# Patient Record
Sex: MALE | Race: WHITE | NOT HISPANIC OR LATINO | Employment: UNEMPLOYED | URBAN - METROPOLITAN AREA
[De-identification: names, ages, dates, MRNs, and addresses within clinical notes are randomized per-mention and may not be internally consistent; named-entity substitution may affect disease eponyms.]

---

## 2018-12-20 PROBLEM — H33.22 RETINAL DETACHMENT, LEFT: Status: ACTIVE | Noted: 2018-12-20

## 2018-12-20 PROBLEM — H21.42: Status: ACTIVE | Noted: 2018-12-20

## 2018-12-20 PROBLEM — H26.9 CATARACT OF LEFT EYE: Status: ACTIVE | Noted: 2018-12-20

## 2019-01-05 ENCOUNTER — HOSPITAL ENCOUNTER (EMERGENCY)
Facility: HOSPITAL | Age: 54
Discharge: HOME | End: 2019-01-05
Attending: EMERGENCY MEDICINE
Payer: MEDICAID

## 2019-01-05 ENCOUNTER — NURSE TRIAGE (OUTPATIENT)
Dept: ADMINISTRATIVE | Facility: CLINIC | Age: 54
End: 2019-01-05

## 2019-01-05 VITALS
TEMPERATURE: 99 F | HEART RATE: 86 BPM | HEIGHT: 68 IN | WEIGHT: 185 LBS | BODY MASS INDEX: 28.04 KG/M2 | DIASTOLIC BLOOD PRESSURE: 80 MMHG | OXYGEN SATURATION: 97 % | RESPIRATION RATE: 18 BRPM | SYSTOLIC BLOOD PRESSURE: 166 MMHG

## 2019-01-05 DIAGNOSIS — H40.9 ACUTE GLAUCOMA OF LEFT EYE: Primary | ICD-10-CM

## 2019-01-05 PROBLEM — H40.2220: Status: ACTIVE | Noted: 2019-01-05

## 2019-01-05 PROBLEM — H21.42: Status: ACTIVE | Noted: 2019-01-05

## 2019-01-05 PROCEDURE — 25000003 PHARM REV CODE 250: Performed by: EMERGENCY MEDICINE

## 2019-01-05 PROCEDURE — 99284 PR EMERGENCY DEPT VISIT,LEVEL IV: ICD-10-PCS | Mod: ,,, | Performed by: EMERGENCY MEDICINE

## 2019-01-05 PROCEDURE — 25000003 PHARM REV CODE 250: Performed by: PHYSICIAN ASSISTANT

## 2019-01-05 PROCEDURE — 96374 THER/PROPH/DIAG INJ IV PUSH: CPT

## 2019-01-05 PROCEDURE — 99284 EMERGENCY DEPT VISIT MOD MDM: CPT | Mod: ,,, | Performed by: EMERGENCY MEDICINE

## 2019-01-05 PROCEDURE — 99285 EMERGENCY DEPT VISIT HI MDM: CPT | Mod: 25

## 2019-01-05 PROCEDURE — 63600175 PHARM REV CODE 636 W HCPCS: Performed by: EMERGENCY MEDICINE

## 2019-01-05 RX ORDER — ONDANSETRON 2 MG/ML
4 INJECTION INTRAMUSCULAR; INTRAVENOUS
Status: COMPLETED | OUTPATIENT
Start: 2019-01-05 | End: 2019-01-05

## 2019-01-05 RX ORDER — DORZOLAMIDE HCL 20 MG/ML
1 SOLUTION/ DROPS OPHTHALMIC 3 TIMES DAILY
Qty: 10 ML | Refills: 0 | Status: SHIPPED | OUTPATIENT
Start: 2019-01-05 | End: 2019-02-04

## 2019-01-05 RX ORDER — PROPARACAINE HYDROCHLORIDE 5 MG/ML
1 SOLUTION/ DROPS OPHTHALMIC
Status: COMPLETED | OUTPATIENT
Start: 2019-01-05 | End: 2019-01-05

## 2019-01-05 RX ORDER — TIMOLOL MALEATE 5 MG/ML
1 SOLUTION/ DROPS OPHTHALMIC
Status: COMPLETED | OUTPATIENT
Start: 2019-01-05 | End: 2019-01-05

## 2019-01-05 RX ORDER — ACETAZOLAMIDE 500 MG/1
500 CAPSULE, EXTENDED RELEASE ORAL 2 TIMES DAILY
Qty: 60 CAPSULE | Refills: 0 | Status: SHIPPED | OUTPATIENT
Start: 2019-01-05 | End: 2019-01-11

## 2019-01-05 RX ORDER — DORZOLAMIDE HCL 20 MG/ML
1 SOLUTION/ DROPS OPHTHALMIC 3 TIMES DAILY
Status: DISCONTINUED | OUTPATIENT
Start: 2019-01-05 | End: 2019-01-05 | Stop reason: HOSPADM

## 2019-01-05 RX ORDER — TIMOLOL MALEATE 5 MG/ML
1 SOLUTION/ DROPS OPHTHALMIC 2 TIMES DAILY
Qty: 10 ML | Refills: 0 | Status: SHIPPED | OUTPATIENT
Start: 2019-01-05 | End: 2019-02-04

## 2019-01-05 RX ORDER — METOCLOPRAMIDE 10 MG/1
10 TABLET ORAL
Status: COMPLETED | OUTPATIENT
Start: 2019-01-05 | End: 2019-01-05

## 2019-01-05 RX ORDER — ONDANSETRON 4 MG/1
4 TABLET, FILM COATED ORAL EVERY 6 HOURS PRN
Qty: 20 TABLET | Refills: 0 | Status: SHIPPED | OUTPATIENT
Start: 2019-01-05 | End: 2019-01-10

## 2019-01-05 RX ORDER — ONDANSETRON 2 MG/ML
4 INJECTION INTRAMUSCULAR; INTRAVENOUS
Status: DISCONTINUED | OUTPATIENT
Start: 2019-01-05 | End: 2019-01-05

## 2019-01-05 RX ORDER — BRIMONIDINE TARTRATE 1.5 MG/ML
1 SOLUTION/ DROPS OPHTHALMIC 3 TIMES DAILY
Qty: 10 ML | Refills: 0 | Status: SHIPPED | OUTPATIENT
Start: 2019-01-05 | End: 2019-02-04

## 2019-01-05 RX ORDER — PROPARACAINE HYDROCHLORIDE 5 MG/ML
1 SOLUTION/ DROPS OPHTHALMIC
Status: DISCONTINUED | OUTPATIENT
Start: 2019-01-05 | End: 2019-01-05 | Stop reason: HOSPADM

## 2019-01-05 RX ORDER — BRIMONIDINE TARTRATE 1.5 MG/ML
1 SOLUTION/ DROPS OPHTHALMIC
Status: COMPLETED | OUTPATIENT
Start: 2019-01-05 | End: 2019-01-05

## 2019-01-05 RX ADMIN — TIMOLOL MALEATE 1 DROP: 5 SOLUTION OPHTHALMIC at 02:01

## 2019-01-05 RX ADMIN — DORZOLAMIDE HYDROCHLORIDE 1 DROP: 20 SOLUTION/ DROPS OPHTHALMIC at 03:01

## 2019-01-05 RX ADMIN — PROPARACAINE HYDROCHLORIDE 1 DROP: 5 SOLUTION/ DROPS OPHTHALMIC at 02:01

## 2019-01-05 RX ADMIN — BRIMONIDINE TARTRATE 1 DROP: 1.5 SOLUTION OPHTHALMIC at 02:01

## 2019-01-05 RX ADMIN — ONDANSETRON 4 MG: 2 INJECTION INTRAMUSCULAR; INTRAVENOUS at 03:01

## 2019-01-05 RX ADMIN — METOCLOPRAMIDE 10 MG: 10 TABLET ORAL at 06:01

## 2019-01-05 NOTE — ED NOTES
Pt resting in bed. No acute distress noted. Respirations even and unlabored. No new complaints voiced. Family at bedside. Will continue to monitor.

## 2019-01-05 NOTE — CONSULTS
[facility]  Ophthalmology  Consult Note    Patient Name: Jackelin Rocha  MRN: [id number]  Admission Date: 1/5/2019  Hospital Length of Stay: 0 days  Attending Provider: John Dos Santos MD   Primary Care Physician: Primary Doctor No  Principal Problem:<principal problem not specified>    Inpatient consult to Ophthalmology  Consult performed by: Valdo Cardoza MD  Consult ordered by: John Dos Santos MD  Reason for consult: left eye pain        Subjective:     Chief Complaint:  Left eye pain    HPI:   Mr. Rocha is a 54 y/o gentleman who was transferred to [facility] with left eye pain x 1 day. 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 Patient was treated with LPI on 12/20/18 and returned to clinic 1 week later for IOP check (seen by Dr. Aucña) and IOP at that time was 12. As per chart review patient is questionably barely LP vs NLP in left eye at current baseline prior to episode of left eye pain. Patient was seen at ED on [facility] and IOP was checked and reported to be in the 60's, patient was then given 1-2 rounds of IV Diamox and case discussed with Dr. Newman for transfer to [facility]. Upon my exam patient states    Gtts: PF QID OS, Prolensa qday OS   PMHx:  All: Hydrocodone    No new subjective & objective note has been filed under this hospital service since the last note was generated.      Base Eye Exam     Visual Acuity (Snellen - Linear)       Right Left    Dist sc 20/25 LP          Tonometry (Tonopen, 3:40 PM)       Right Left    Pressure 12 38          Tonometry #2 (3:49 PM)       Right Left    Pressure  32          Tonometry #3 (4:24 PM)       Right Left    Pressure  19          Pupils       Dark Light Shape React APD    Right 4 2 Round 1 None    Left 4 3 Round 1 None           Visual Fields       Right Left     Full     Restrictions  Total superior temporal, inferior temporal, superior nasal, inferior nasal deficiencies          Extraocular Movement       Right Left     Full, Ortho Full, Ortho          Neuro/Psych     Oriented x3:  Yes    Mood/Affect:  Normal            Slit Lamp and Fundus Exam     External Exam       Right Left    External Normal Normal          Slit Lamp Exam       Right Left    Lids/Lashes Normal Normal    Conjunctiva/Sclera White and quiet 1+ Injection    Cornea Clear 2+MCE    Anterior Chamber Deep and quiet Narrow, 2+ flare, no cells    Iris Round and reactive LPI @ 2 o'clock with central slit-like opening, posterior synechia, bowing forward     Lens Clear white cataract with pigmentation on ant lens capsule    Vitreous Normal no view               Assessment and Plan:     Iris bombe of left eye    - Patient recently seen in [facility] and diagnosed with iris bombe, white cataract, and 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  - At last visit in clinic patient LP and IOP WNL OU  - s/p LPI OS on 12/20/18 with stable IOP 1 week later on follow up  - Pt presented as transfer from outside ED with 1 day history of left eye pain, nausea, and headache  - Prior to transfer patient was given 2 doses IV Diamox 500mg with IOP decreasing into 50's, IOP down to 36 at presentation to [facility] ED  - Ant exam left eye shows 2+ MCE, iris bowing, dense white cataract, 2+ flare/no cell, and 1+ conj injection  - Started on max drops in ED and after multiple rounds of Timolol (no COPD/Asthma), Dorzolamide (denies Sulfa allergies), and Brimonidine, IOP decreased to 32 and followed by a reading of 19 10 minutes later  - Discussed case with Dr. Acuña who recommends patient to follow up in [facility] on Thursday and to start max IOP lowering drops and PO Diamox to decrease IOP in the interim  - Stop taking current drops, PF and Prolensa in left eye  - Recommend starting Timolol maleate 0.5% BID  OS, Brimonidine .15% TID OS, Dorzolamide TID OS - discussed regimen with patient and family  - Start PO Diamox 500mg BID - needs prescription  - Recommend anti-nausea medications to help with nausea (Zofran - needs prescription) and Tylenol/Advil for headache  - Follow up appt on Thursday at [facility] with Dr. Acuña, return precautions discussed in detail with patient  - Pt also has retina appt on Friday at [facility] for evaluation of funnel RD OS       Thank you for your consult. I will follow-up with patient. Please contact us if you have any additional questions.     Case discussed with Dr. Arianne Cardoza MD  Ophthalmology  [facility]

## 2019-01-05 NOTE — ASSESSMENT & PLAN NOTE
- Patient recently seen in [facility] and diagnosed with iris bombe, white cataract, and 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  - At last visit in clinic patient LP and IOP WNL OU  - s/p LPI OS on 12/20/18 with stable IOP 1 week later on follow up  - Pt presented as transfer from outside ED with 1 day history of left eye pain, nausea, and headache  - Prior to transfer patient was given 2 doses IV Diamox 500mg with IOP decreasing into 50's, IOP down to 36 at presentation to [facility] ED  - Ant exam left eye shows 2+ MCE, iris bowing, dense white cataract, 2+ flare/no cell, and 1+ conj injection  - Started on max drops in ED and after multiple rounds of Timolol (no COPD/Asthma), Dorzolamide (denies Sulfa allergies), and Brimonidine, IOP decreased to 32 and followed by a reading of 19 10 minutes later  - Discussed case with Dr. Acuña who recommends patient to follow up in [facility] on Thursday and to start max IOP lowering drops and PO Diamox to decrease IOP in the interim  - Stop taking current drops, PF and Prolensa in left eye  - Recommend starting Timolol maleate 0.5% BID OS, Brimonidine .15% TID OS, Dorzolamide TID OS - discussed regimen with patient and family  - Start PO Diamox 500mg BID - needs prescription  - Recommend anti-nausea medications to help with nausea (Zofran - needs prescription) and Tylenol/Advil for headache  - Follow up appt on Thursday at [facility] with Dr. Acuña, return precautions discussed in detail with patient  - Pt also has retina appt on Friday at [facility] for evaluation of funnel RD OS

## 2019-01-05 NOTE — ED PROVIDER NOTES
"Encounter Date: 2019    SCRIBE #1 NOTE: I, Shelley Mendez, am scribing for, and in the presence of,  Dr. Dos Santos. I have scribed the following portions of the note - Other sections scribed: HPI, ROS, PE.       History     Chief Complaint   Patient presents with    Eye Pain     Pt transferred from [facility] for left eye pain.  Pt s/p laser surgery-dx with acute glaucoma     Time patient was seen by the provider: 2:32 PM      Ms. Rocha is a 53 y.o. male status with history of retinal detachment and cataract of left eye who presents to the ED with a complaint of left eye pain. Patient is a transfer from [facility] for Ophthalmology consult. Labs significant for WBC of 12. Had negative head CT. He was given 2 mg of morphine, 4 mg Zofran, 500 mg diamox and pilocarpine drops. There he had an IOP of 28 in right eye and left eye dropped from 65 to 55, Patient reports he had laser surgery a week ago for acute glaucoma to his left eye. He states he felt fine until last night when he started having pain in his left eye. He states the pain worsened this morning with nausea, vomiting and a, "pounding," headache. States he has a retinal detachment and cannot even see light in left eye. Denies fevers, chills, chest pain, SOB, abd pain, numbness, tingling, weakness.      The history is provided by the patient and medical records.     Review of patient's allergies indicates:   Allergen Reactions    Hydrocodone Nausea And Vomiting     History reviewed. No pertinent past medical history.  Past Surgical History:   Procedure Laterality Date    BACK SURGERY      MASTOID SURGERY      TONSILLECTOMY       Family History   Problem Relation Age of Onset    Coronary artery disease Mother      Social History     Tobacco Use    Smoking status: Former Smoker     Last attempt to quit: 2006     Years since quittin.0    Smokeless tobacco: Never Used   Substance Use Topics    Alcohol use: Yes    Drug use: No     Review of " Systems   Constitutional: Negative.    HENT: Negative.    Eyes: Positive for pain (left).   Respiratory: Negative.    Cardiovascular: Negative.    Gastrointestinal: Positive for nausea and vomiting.   Genitourinary: Negative.    Musculoskeletal: Positive for arthralgias.   Neurological: Positive for headaches.   All other systems reviewed and are negative.      Physical Exam     Initial Vitals [01/05/19 1350]   BP Pulse Resp Temp SpO2   138/78 78 18 98.5 °F (36.9 °C) 98 %      MAP       --         Physical Exam    Nursing note and vitals reviewed.  Constitutional: He appears well-developed and well-nourished. He is not diaphoretic. No distress.   HENT:   Head: Normocephalic and atraumatic.   Right Ear: External ear normal.   Left Ear: External ear normal.   Eyes:   IOP of 14 in R eye, 36 in L eye, left eye injected, pain with EOM in left eye   Neck: Neck supple.   Cardiovascular: Normal rate, regular rhythm, normal heart sounds and intact distal pulses.   Pulmonary/Chest: Breath sounds normal. He has no wheezes. He has no rhonchi. He has no rales.   Abdominal: Soft. He exhibits no distension. There is no tenderness.   Neurological: He is alert and oriented to person, place, and time. GCS score is 15. GCS eye subscore is 4. GCS verbal subscore is 5. GCS motor subscore is 6.   Skin: Skin is warm. Capillary refill takes less than 2 seconds. No rash noted.   Psychiatric: He has a normal mood and affect.         ED Course   Procedures  Labs Reviewed - No data to display       Imaging Results    None          Medical Decision Making:   History:   I obtained history from: someone other than patient and another health care provider.  Old Medical Records: I decided to obtain old medical records.  Old Records Summarized: records from another hospital.  Clinical Tests:   Lab Tests: Reviewed  Radiological Study: Reviewed  ED Management:  Vitals normal. Afebrile. Here w/ L eye glaucoma. Has no vision in that eye. Pressure  improved some PTA to 36 mmHg. Ophthalmology emergently consulted. Discussed with ophthalmology who recommended brimonidine, timolol and dorzolamide drops; ordered. They evaluated and treated patient in ED.    Patient signed out to Dr. Handy at shift change to f/u ophthalmology recs and overall disposition. I suspect patient will be discharged home.   Other:   I have discussed this case with another health care provider.       <> Summary of the Discussion: Ophthalmology            Scribe Attestation:   Scribe #1: I performed the above scribed service and the documentation accurately describes the services I performed. I attest to the accuracy of the note.               Clinical Impression:   The encounter diagnosis was Acute glaucoma of left eye.                             John Dos Santos MD  01/06/19 5728

## 2019-01-05 NOTE — HPI
Mr. Rocha is a 54 y/o gentleman who was transferred to [facility] with left eye pain x 1 day. Patient has been followed recently at 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 Patient was treated with LPI on 12/20/18 and returned to clinic 1 week later for IOP check (seen by Dr. Acuña) and IOP at that time was 12. As per chart review patient is questionably barely LP vs NLP in left eye at current baseline prior to episode of left eye pain. Patient was seen at ED on [facility] and IOP was checked and reported to be in the 60's, patient was then given 1-2 rounds of IV Diamox and case discussed with Dr. Newman for transfer to [facility]. Upon my exam patient states    Gtts: PF QID OS, Prolensa qday OS   PMHx:  All: Hydrocodone

## 2019-01-05 NOTE — ED TRIAGE NOTES
Arrives as a transfer for optho consult. Pt c/o left eye pain, throbbing HA, and decreased vision since last night. Reports had surgery to left eye 12/20 and has been fine until last night. Pt also reports nausea.

## 2019-01-05 NOTE — DISCHARGE INSTRUCTIONS
Stop taking current drops, PF and Prolensa in left eye  - Recommend starting Timolol maleate 0.5% BID OS, Brimonidine .15% TID OS, Dorzolamide TID OS   - Start PO Diamox 500mg BID   - Recommend anti-nausea medications to help with nausea (Zofran - needs prescription) and Tylenol/Advil for headache  - Follow up appt on Thursday at [facility] with Dr. Acuña, return precautions discussed in detail with patient  - Pt also has retina appt on Friday at [facility] for evaluation of funnel RD OS

## 2019-01-05 NOTE — ED NOTES
Patient identifiers have been checked and are correct.  Patient assisted to ED stretcher and placed in a hospital gown.     LOC: The patient is awake, alert, and aware of environment. The patient is oriented x 3 and speaking appropriately.   SKIN: The skin is warm, dry.  RESPIRATORY: Airway is open and patent. Bilateral chest rise and fall. Respirations are spontaneous, even and unlabored. Normal effort and rate noted. No accessory muscle use noted.   CARDIAC: Patient has a normal rate.  NEUROLOGIC: Eyes open spontaneously. Speech clear. Tolerating saliva secretions well. Able to follow commands. Symmetrical facial muscles. Moving all extremities. Movement is purposeful.   MUSCULOSKELETAL: No obvious deformities noted.   EYE: L-4mm, round and reactive. R-3mm, round and unable to determine if reactive (Dr. Dos Santos aware), appears unreactive. Redness noted to L sclera with decrease vision and light sensitivity.

## 2019-01-05 NOTE — ED NOTES
Pt requesting a bed to lay down in. ED stretcher placed in room. [place] offered, pt refused. Lights turned off in room. Denies any current nausea. Will continue to monitor.

## 2019-01-06 NOTE — ED NOTES
Pt requesting nausea medication prior to leaving. Dr. Handy made aware, waiting for further orders.

## 2020-02-28 ENCOUNTER — HOSPITAL ENCOUNTER (EMERGENCY)
Facility: HOSPITAL | Age: 55
Discharge: HOME | End: 2020-02-28
Attending: EMERGENCY MEDICINE
Payer: MEDICAID

## 2020-02-28 VITALS
OXYGEN SATURATION: 97 % | TEMPERATURE: 99 F | HEIGHT: 66 IN | HEART RATE: 98 BPM | SYSTOLIC BLOOD PRESSURE: 154 MMHG | WEIGHT: 200 LBS | BODY MASS INDEX: 32.14 KG/M2 | RESPIRATION RATE: 18 BRPM | DIASTOLIC BLOOD PRESSURE: 99 MMHG

## 2020-02-28 DIAGNOSIS — J30.9 ALLERGIC RHINITIS, UNSPECIFIED SEASONALITY, UNSPECIFIED TRIGGER: ICD-10-CM

## 2020-02-28 DIAGNOSIS — H92.01 RIGHT EAR PAIN: ICD-10-CM

## 2020-02-28 DIAGNOSIS — J11.1 INFLUENZA-LIKE ILLNESS: Primary | ICD-10-CM

## 2020-02-28 DIAGNOSIS — Z20.828 EXPOSURE TO INFLUENZA: ICD-10-CM

## 2020-02-28 LAB
CTP QC/QA: YES
POC MOLECULAR INFLUENZA A AGN: NEGATIVE
POC MOLECULAR INFLUENZA B AGN: NEGATIVE

## 2020-02-28 PROCEDURE — 87502 INFLUENZA DNA AMP PROBE: CPT | Mod: ER

## 2020-02-28 PROCEDURE — 99284 EMERGENCY DEPT VISIT MOD MDM: CPT | Mod: 25,ER

## 2020-02-28 RX ORDER — FLUTICASONE PROPIONATE 50 MCG
1 SPRAY, SUSPENSION (ML) NASAL 2 TIMES DAILY PRN
Qty: 15 G | Refills: 0 | Status: SHIPPED | OUTPATIENT
Start: 2020-02-28

## 2020-02-28 RX ORDER — OSELTAMIVIR PHOSPHATE 75 MG/1
75 CAPSULE ORAL 2 TIMES DAILY
Qty: 10 CAPSULE | Refills: 0 | Status: SHIPPED | OUTPATIENT
Start: 2020-02-28 | End: 2020-03-04

## 2020-02-28 RX ORDER — DEXTROMETHORPHAN HYDROBROMIDE, GUAIFENESIN 5; 100 MG/5ML; MG/5ML
650 LIQUID ORAL EVERY 8 HOURS
Qty: 20 TABLET | Refills: 0 | Status: SHIPPED | OUTPATIENT
Start: 2020-02-28

## 2020-02-28 RX ORDER — IBUPROFEN 600 MG/1
600 TABLET ORAL EVERY 6 HOURS PRN
Qty: 20 TABLET | Refills: 0 | Status: SHIPPED | OUTPATIENT
Start: 2020-02-28 | End: 2020-07-17 | Stop reason: SDUPTHER

## 2020-02-28 RX ORDER — CETIRIZINE HYDROCHLORIDE 10 MG/1
10 TABLET ORAL DAILY
Qty: 30 TABLET | Refills: 0 | Status: SHIPPED | OUTPATIENT
Start: 2020-02-28 | End: 2021-02-27

## 2020-02-28 NOTE — ED PROVIDER NOTES
Encounter Date: 2020    SCRIBE #1 NOTE: I, Elva Bustillo, am scribing for, and in the presence of,  MATA Armenta. I have scribed the following portions of the note - Other sections scribed: HPI, ROS, PE.       History     Chief Complaint   Patient presents with    Otalgia     Rt. ear pain for  1 week.      Sinusitis      nasal drainage.   also his  girl friend  was diagnosed with the flu yesterday.       Jackelin Rocha is a 54 y.o. male who presents to the ED complaining of intermittent right ear pain, rhinorrhea, and nasal congestion for 1 week. Patient reports contact with girlfriend who was diagnosed with the flu yesterday.  Patient denies rash, chest pain, SOB, numbness, weakness, tingling, abdominal pain, back pain, dysuria, hematuria, nausea, vomiting, diarrhea, or any other complaints.  Patient denies pain at present and has tried OTC drops for his ears with minimal relief.      The history is provided by the patient. No  was used.     Review of patient's allergies indicates:   Allergen Reactions    Hydrocodone Nausea And Vomiting     Past Medical History:   Diagnosis Date    Retinal detachment      Past Surgical History:   Procedure Laterality Date    BACK SURGERY      MASTOID SURGERY      TONSILLECTOMY       Family History   Problem Relation Age of Onset    Coronary artery disease Mother      Social History     Tobacco Use    Smoking status: Former Smoker     Last attempt to quit: 2006     Years since quittin.2    Smokeless tobacco: Never Used   Substance Use Topics    Alcohol use: Yes    Drug use: No     Review of Systems   Constitutional: Negative for chills and fever.   HENT: Positive for congestion (nasal), ear pain (right) and rhinorrhea. Negative for sore throat.    Eyes: Negative for pain, discharge and redness.   Respiratory: Negative for cough and shortness of breath.    Cardiovascular: Negative for chest pain.   Gastrointestinal: Negative for  abdominal pain, diarrhea, nausea and vomiting.   Genitourinary: Negative for dysuria and hematuria.   Musculoskeletal: Negative for back pain, neck pain and neck stiffness.   Skin: Negative for rash.   Neurological: Negative for dizziness, weakness, light-headedness, numbness and headaches.   Psychiatric/Behavioral: Negative for confusion.       Physical Exam     Initial Vitals [02/28/20 1007]   BP Pulse Resp Temp SpO2   (!) 154/99 98 18 98.6 °F (37 °C) 97 %      MAP       --         Physical Exam    Nursing note and vitals reviewed.  Constitutional: Vital signs are normal. He appears well-developed. He is cooperative.  Non-toxic appearance. He does not appear ill.   HENT:   Head: Normocephalic and atraumatic.   Right Ear: Tympanic membrane, external ear and ear canal normal.   Left Ear: Tympanic membrane, external ear and ear canal normal.   Nose: Mucosal edema and rhinorrhea present.   Mouth/Throat: Uvula is midline, oropharynx is clear and moist and mucous membranes are normal.   Eyes: Conjunctivae are normal.   Neck: Normal range of motion.   Cardiovascular: Normal rate and regular rhythm.   Pulmonary/Chest: Effort normal and breath sounds normal.   Abdominal: Normal appearance.   Neurological: He is alert and oriented to person, place, and time. GCS eye subscore is 4. GCS verbal subscore is 5. GCS motor subscore is 6.   Skin: Skin is warm, dry and intact. No rash noted.   Psychiatric: He has a normal mood and affect. His speech is normal and behavior is normal. Thought content normal.         ED Course   Procedures  Labs Reviewed   POCT INFLUENZA A/B MOLECULAR          Imaging Results    None          Medical Decision Making:   History:   Old Medical Records: I decided to obtain old medical records.  Clinical Tests:   Lab Tests: Ordered and Reviewed       APC / Resident Notes:   This is an evaluation of a 54 y.o. male that presents to the Emergency Department for right ear pain, Runny Nose and Nasal Congestion.  The patient is a non-toxic, afebrile, and well appearing male. On physical exam: Ears: without infection. Pharynx without infection. Appears well hydrated with moist mucus membranes. Neck soft and supple with no meningeal signs. Breath sounds are clear and equal bilaterally. No tachypnea or respiratory distress with room air pulse ox of 97% and no evidence of hypoxia or cyanosis.     Vital Signs Are Reassuring. RESULTS: Influenza: Negative.     The patient is within the antiviral treatment window and has been offered treatment with [employer]. Risks/Benefits discussed. [employer] information handout will be on the discharge paperwork.     My overall impression is Influenza like illness, right ear pain, influenza exposure. I considered, but at this time, do not suspect OM, OE, strep pharyngitis, meningitis, pneumonia, significant dehydration, bacterial sinusitis.    ED Course: D/C Meds: Tamiflu, Zyrtec, Flonase, Ibuprofen, Tylenol. D/C Information: Supportive care, Tylenol/Ibuprofen PRN, Hydration. The diagnosis, treatment plan, instructions for follow-up and reevaluation with PCP as well as ED return precautions were discussed and understanding was verbalized. All questions or concerns have been addressed.          Scribe Attestation:   Scribe #1: I performed the above scribed service and the documentation accurately describes the services I performed. I attest to the accuracy of the note.    Physician Attestation for Scribe:  Physician Attestation Statement for Scribe: I, MATA Armenta, reviewed documentation, as scribed by Elva Bustillo in my presence, and it is both accurate and complete.                               Clinical Impression:     1. Influenza-like illness    2. Exposure to influenza    3. Right ear pain    4. Allergic rhinitis, unspecified seasonality, unspecified trigger        Disposition:   Disposition: Discharged  Condition: Stable                        MATA Stoll  02/28/20 1202

## 2020-07-17 ENCOUNTER — HOSPITAL ENCOUNTER (EMERGENCY)
Facility: HOSPITAL | Age: 55
Discharge: HOME | End: 2020-07-17
Attending: EMERGENCY MEDICINE
Payer: MEDICAID

## 2020-07-17 VITALS
HEIGHT: 66 IN | RESPIRATION RATE: 18 BRPM | OXYGEN SATURATION: 99 % | DIASTOLIC BLOOD PRESSURE: 82 MMHG | WEIGHT: 198 LBS | HEART RATE: 69 BPM | BODY MASS INDEX: 31.82 KG/M2 | TEMPERATURE: 99 F | SYSTOLIC BLOOD PRESSURE: 150 MMHG

## 2020-07-17 DIAGNOSIS — M79.10 MUSCLE PAIN: ICD-10-CM

## 2020-07-17 DIAGNOSIS — R10.9 FLANK PAIN: Primary | ICD-10-CM

## 2020-07-17 DIAGNOSIS — R31.9 HEMATURIA, UNSPECIFIED TYPE: ICD-10-CM

## 2020-07-17 DIAGNOSIS — R74.8 ELEVATED LIVER ENZYMES: ICD-10-CM

## 2020-07-17 LAB
ALBUMIN SERPL-MCNC: 4.4 G/DL (ref 3.3–5.5)
ALP SERPL-CCNC: 67 U/L (ref 42–141)
BILIRUB SERPL-MCNC: 0.7 MG/DL (ref 0.2–1.6)
BILIRUBIN, POC UA: NEGATIVE
BLOOD, POC UA: ABNORMAL
BUN SERPL-MCNC: 18 MG/DL (ref 7–22)
CALCIUM SERPL-MCNC: 9.8 MG/DL (ref 8–10.3)
CHLORIDE SERPL-SCNC: 100 MMOL/L (ref 98–108)
CLARITY, POC UA: CLEAR
COLOR, POC UA: YELLOW
CREAT SERPL-MCNC: 0.6 MG/DL (ref 0.6–1.2)
GLUCOSE SERPL-MCNC: 142 MG/DL (ref 73–118)
GLUCOSE, POC UA: NEGATIVE
KETONES, POC UA: NEGATIVE
LEUKOCYTE EST, POC UA: NEGATIVE
NITRITE, POC UA: NEGATIVE
PH UR STRIP: 5.5 [PH]
POC ALT (SGPT): 63 U/L (ref 10–47)
POC AST (SGOT): 40 U/L (ref 11–38)
POC TCO2: 28 MMOL/L (ref 18–33)
POTASSIUM BLD-SCNC: 3.8 MMOL/L (ref 3.6–5.1)
PROTEIN, POC UA: NEGATIVE
PROTEIN, POC: 7.9 G/DL (ref 6.4–8.1)
SODIUM BLD-SCNC: 143 MMOL/L (ref 128–145)
SPECIFIC GRAVITY, POC UA: 1.01
UROBILINOGEN, POC UA: 0.2 E.U./DL

## 2020-07-17 PROCEDURE — 85025 COMPLETE CBC W/AUTO DIFF WBC: CPT | Mod: ER

## 2020-07-17 PROCEDURE — 96360 HYDRATION IV INFUSION INIT: CPT | Mod: ER

## 2020-07-17 PROCEDURE — 25000003 PHARM REV CODE 250: Mod: ER | Performed by: NURSE PRACTITIONER

## 2020-07-17 PROCEDURE — 80053 COMPREHEN METABOLIC PANEL: CPT | Mod: ER

## 2020-07-17 PROCEDURE — 99284 EMERGENCY DEPT VISIT MOD MDM: CPT | Mod: 25,ER

## 2020-07-17 PROCEDURE — 81003 URINALYSIS AUTO W/O SCOPE: CPT | Mod: ER

## 2020-07-17 RX ORDER — IBUPROFEN 600 MG/1
600 TABLET ORAL EVERY 6 HOURS PRN
Qty: 20 TABLET | Refills: 0 | Status: SHIPPED | OUTPATIENT
Start: 2020-07-17

## 2020-07-17 RX ORDER — SODIUM CHLORIDE 9 MG/ML
500 INJECTION, SOLUTION INTRAVENOUS
Status: COMPLETED | OUTPATIENT
Start: 2020-07-17 | End: 2020-07-17

## 2020-07-17 RX ADMIN — SODIUM CHLORIDE 500 ML: 0.9 INJECTION, SOLUTION INTRAVENOUS at 07:07

## 2020-07-17 NOTE — PROVIDER PROGRESS NOTES - EMERGENCY DEPT.
Emergency Department TeleTRIAGE Encounter Note      CHIEF COMPLAINT    Chief Complaint   Patient presents with    Back Pain     started a couple weeks ago. thought it was a torn muscle. states it is hurting worse now. states kind of wraps around to the front. denies frequency, dysuria, constipation.        VITAL SIGNS   Initial Vitals [07/17/20 1356]   BP Pulse Resp Temp SpO2   (!) 148/98 79 18 98.1 °F (36.7 °C) 97 %      MAP       --            ALLERGIES    Review of patient's allergies indicates:   Allergen Reactions    Hydrocodone Nausea And Vomiting    Codeine Nausea Only       PROVIDER TRIAGE NOTE  Pt is a 55 yo male presenting for right flank pain for the past 3 weeks.  Pt states pain is worse with movement this morning.  Pt denies any dysuria and denies any history of kidney stones.  Pt states pain worse after eating boiled shrimp.        ORDERS  Labs Reviewed   POCT URINALYSIS W/O SCOPE       ED Orders (720h ago, onward)    Start Ordered     Status Ordering Provider    07/17/20 1418 07/17/20 1417  POCT URINALYSIS W/O SCOPE  Once      Ordered RANCHO BRICE            Virtual Visit Note: The provider triage portion of this emergency department evaluation and documentation was performed via [employer], a HIPAA-compliant telemedicine application, in concert with a tele-presenter in the room. A face to face patient evaluation with one of my colleagues will occur once the patient is placed in an emergency department room.      DISCLAIMER: This note was prepared with [employer] voice recognition transcription software. Garbled syntax, mangled pronouns, and other bizarre constructions may be attributed to that software system.

## 2020-07-17 NOTE — ED PROVIDER NOTES
Encounter Date: 7/17/2020    SCRIBE #1 NOTE: I, Pat Citlalli, am scribing for, and in the presence of,  MATA Alfredo. I have scribed the following portions of the note - Other sections scribed: HPI, ROS, PE.       History     Chief Complaint   Patient presents with    Back Pain     started a couple weeks ago. thought it was a torn muscle. states it is hurting worse now. states kind of wraps around to the front. denies frequency, dysuria, constipation.      This is a nontoxic appearing 54 y.o. male who presents to the ED for evaluation of right sided back pain that started a couple of weeks ago and radiates to the right flank. Patient reports that today the pain increases with movement at times. Denies nausea, vomiting, hematuria, dysuria, frequency, fever, or chills. He has no history of kidney stones. Denies heavy lifting. No attempted treatment. 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     The history is provided by the patient. No  was used.   Back Pain   This is a recurrent problem. The current episode started several weeks ago. The problem has been gradually worsening. The pain is associated with lifting heavy objects. The pain is present in the lumbar spine. Radiates to: right flank. The pain is at a severity of 3/10. The symptoms are aggravated by bending and twisting. Pertinent negatives include no chest pain, no fever, no numbness, no abdominal pain and no dysuria.     Review of patient's allergies indicates:   Allergen Reactions    Hydrocodone Nausea And Vomiting    Codeine Nausea Only     Past Medical History:   Diagnosis Date    Retinal detachment      Past Surgical History:   Procedure Laterality Date    BACK SURGERY      MASTOID SURGERY      TONSILLECTOMY       Family History   Problem Relation Age of Onset    Coronary artery disease Mother      Social History     Tobacco Use    Smoking status: Former Smoker     Quit date: 12/20/2006      Years since quittin.5    Smokeless tobacco: Never Used   Substance Use Topics    Alcohol use: Yes    Drug use: No     Review of Systems   Constitutional: Negative.  Negative for chills and fever.   HENT: Negative.    Eyes: Negative.    Respiratory: Negative.  Negative for cough and shortness of breath.    Cardiovascular: Negative.  Negative for chest pain.   Gastrointestinal: Negative.  Negative for abdominal pain, diarrhea, nausea and vomiting.   Endocrine: Negative.    Genitourinary: Positive for flank pain (right-sided). Negative for dysuria, frequency and hematuria.   Musculoskeletal: Positive for back pain (right-sided).   Skin: Negative.    Allergic/Immunologic: Negative.    Neurological: Negative.  Negative for numbness.   Hematological: Negative.    Psychiatric/Behavioral: Negative.    All other systems reviewed and are negative.      Physical Exam     Initial Vitals [20 1356]   BP Pulse Resp Temp SpO2   (!) 148/98 79 18 98.1 °F (36.7 °C) 97 %      MAP       --         Physical Exam    Nursing note and vitals reviewed.  Constitutional: He appears well-developed.   HENT:   Head: Normocephalic.   Nose: Nose normal.   Mouth/Throat: Oropharynx is clear and moist.   Eyes: Conjunctivae are normal.   Neck: Normal range of motion. Neck supple.   Cardiovascular: Normal rate, regular rhythm, S1 normal, S2 normal and normal heart sounds. Exam reveals no gallop and no friction rub.    No murmur heard.  Pulmonary/Chest: Breath sounds normal. No respiratory distress. He has no wheezes. He has no rhonchi. He has no rales.   Abdominal: Soft. Bowel sounds are normal. There is no abdominal tenderness. There is no CVA tenderness.   Musculoskeletal: Normal range of motion.   Neurological: He is alert and oriented to person, place, and time.   Skin: Skin is warm and dry. Capillary refill takes less than 2 seconds.   Psychiatric: He has a normal mood and affect. His behavior is normal.         ED Course    Procedures  Labs Reviewed   POCT CMP - Abnormal; Notable for the following components:       Result Value    ALT (SGPT), POC 63 (*)     AST (SGOT), POC 40 (*)     POC Glucose 142 (*)     All other components within normal limits   POCT CBC   POCT URINALYSIS W/O SCOPE   POCT CMP              Imaging Results          CT Renal Stone Study ABD Pelvis WO (In process)               Imaging Results          CT Renal Stone Study ABD Pelvis WO (Final result)  Result time 07/17/20 19:53:37    Final result by Ryan Almonte MD (07/17/20 19:53:37)                 Impression:      1. No acute intra-abdominal abnormalities identified.  No evidence of stones or obstructive uropathy.  2. Hepatomegaly with hepatic steatosis.  3. Additional findings as detailed above.      Electronically signed by: Ryan Almonte MD  Date:    07/17/2020  Time:    19:53             Narrative:    EXAMINATION:  CT RENAL STONE STUDY ABD PELVIS WO    CLINICAL HISTORY:  Flank pain, kidney stone suspected;    TECHNIQUE:  Low dose axial images, sagittal and coronal reformations were obtained from the lung bases to the pubic symphysis.  Contrast was not administered.    COMPARISON:  CT abdomen and pelvis from March 2009.    FINDINGS:  The visualized portion of the heart is unremarkable.  The lung bases are clear.    Liver is enlarged measuring 21 cm and diffusely hypodense in appearance consistent with diffuse fatty infiltration.  There is no intra-or extrahepatic biliary ductal dilatation.  The gallbladder is unremarkable.  Spleen is small and atrophic.  Stomach, pancreas, and adrenal glands are unremarkable.    Kidneys show no evidence of stones or hydronephrosis. Ureters are unremarkable along their courses.  Urinary bladder prostate are unremarkable.    Appendix is visualized and is unremarkable.  The visualized loops of small and large bowel show no evidence of obstruction or inflammation.  No free air or free fluid.    Aorta tapers normally with  mild atherosclerosis.    No acute osseous abnormality identified.  Postsurgical changes with intervertebral disc space are seen at the L4-5 level.  Subcutaneous soft tissue structures are unremarkable.                                  Medical Decision Making:   History:   Old Medical Records: I decided to obtain old medical records.  Initial Assessment:   This is a nontoxic appearing 54 y.o. male who presents to the ED for evaluation of right sided back pain that started a couple of weeks ago and radiates to the right flank. Patient reports that today the pain increases with movement at times. Denies nausea, vomiting, hematuria, dysuria, frequency, fever, or chills. He has no history of kidney stones. Denies heavy lifting. No attempted treatment.  Differential Diagnosis:   Urinary tract infection. Pyelonephritis. Kidney stones.  Independently Interpreted Test(s):   I have ordered and independently interpreted X-rays - see prior notes.  Clinical Tests:   Lab Tests: Ordered and Reviewed  Radiological Study: Ordered and Reviewed  ED Management:  Physical exam.  Discharge with Motrin.  Discussed labs. Pt states his liver enzymes has been elevated in the past.   Follow-up with PCP in 3 days for urine recheck.               Scribe Attestation:   Scribe #1: I performed the above scribed service and the documentation accurately describes the services I performed. I attest to the accuracy of the note.    This document was produced by a scribe under my direction and in my presence. I agree with the content of the note and have made any necessary edits.     MATA Alfredo    07/17/2020 9:49 PM                      Clinical Impression:     1. Flank pain    2. Muscle pain    3. Elevated liver enzymes    4. Hematuria, unspecified type                                   MATA Nunez  07/17/20 [id number]

## 2020-07-18 NOTE — ED NOTES
Pt states he is unable to void at this time for UA. Pt  Aware needed to collect urine sample and states he will try to void in urinal.

## 2021-05-04 ENCOUNTER — PATIENT MESSAGE (OUTPATIENT)
Dept: RESEARCH | Facility: HOSPITAL | Age: 56
End: 2021-05-04